# Patient Record
Sex: FEMALE | URBAN - METROPOLITAN AREA
[De-identification: names, ages, dates, MRNs, and addresses within clinical notes are randomized per-mention and may not be internally consistent; named-entity substitution may affect disease eponyms.]

---

## 2020-03-16 ENCOUNTER — APPOINTMENT (RX ONLY)
Dept: URBAN - METROPOLITAN AREA CLINIC 108 | Facility: CLINIC | Age: 25
Setting detail: DERMATOLOGY
End: 2020-03-16

## 2020-03-16 DIAGNOSIS — L64.8 OTHER ANDROGENIC ALOPECIA: ICD-10-CM

## 2020-03-16 PROCEDURE — ? PRESCRIPTION

## 2020-03-16 PROCEDURE — ? TREATMENT REGIMEN

## 2020-03-16 PROCEDURE — ? COUNSELING

## 2020-03-16 PROCEDURE — 99202 OFFICE O/P NEW SF 15 MIN: CPT

## 2020-03-16 PROCEDURE — ? ADDITIONAL NOTES

## 2020-03-16 RX ORDER — FLUOCINONIDE 0.5 MG/ML
SOLUTION TOPICAL
Qty: 1 | Refills: 3 | Status: ERX | COMMUNITY
Start: 2020-03-16

## 2020-03-16 RX ADMIN — FLUOCINONIDE: 0.5 SOLUTION TOPICAL at 00:00

## 2020-03-16 ASSESSMENT — LOCATION DETAILED DESCRIPTION DERM: LOCATION DETAILED: LEFT SUPERIOR PARIETAL SCALP

## 2020-03-16 ASSESSMENT — LOCATION SIMPLE DESCRIPTION DERM: LOCATION SIMPLE: SCALP

## 2020-03-16 ASSESSMENT — LOCATION ZONE DERM: LOCATION ZONE: SCALP

## 2020-03-16 NOTE — PROCEDURE: TREATMENT REGIMEN
Otc Regimen: Viviscal supplement, take 1 PO QD\\nBiotin, take 1 PO QD
Detail Level: Zone
Continue Regimen: Rogain foam, apply to scalp BID

## 2020-03-16 NOTE — HPI: HAIR LOSS
Previous Labs: No
How Did The Hair Loss Occur?: sudden in onset
How Severe Is Your Hair Loss?: moderate
Additional History: Patient states she’s been doing the Rogain foam for 4 - 5 week, unable to determine how well it’s working. She has had her labs drawn to check thyroid & hormones, everything came back normal. Patient states she now is experiencing dandruff.

## 2020-03-16 NOTE — PROCEDURE: ADDITIONAL NOTES
Additional Notes: Discussed the option of sprionolactone. Patient advised she has low blood pressure normally. Advised that spironolactone isn’t the best option for her. Discussed PRP treatments.
Detail Level: Simple